# Patient Record
Sex: FEMALE | Race: WHITE
[De-identification: names, ages, dates, MRNs, and addresses within clinical notes are randomized per-mention and may not be internally consistent; named-entity substitution may affect disease eponyms.]

---

## 2019-06-30 ENCOUNTER — HOSPITAL ENCOUNTER (EMERGENCY)
Dept: HOSPITAL 11 - JP.ED | Age: 29
Discharge: HOME | End: 2019-06-30
Payer: COMMERCIAL

## 2019-06-30 DIAGNOSIS — Z91.041: ICD-10-CM

## 2019-06-30 DIAGNOSIS — Z88.5: ICD-10-CM

## 2019-06-30 DIAGNOSIS — Z88.0: ICD-10-CM

## 2019-06-30 DIAGNOSIS — E86.0: Primary | ICD-10-CM

## 2019-06-30 DIAGNOSIS — Z79.899: ICD-10-CM

## 2019-06-30 DIAGNOSIS — Z87.891: ICD-10-CM

## 2019-06-30 DIAGNOSIS — F41.9: ICD-10-CM

## 2019-06-30 DIAGNOSIS — F32.9: ICD-10-CM

## 2019-06-30 PROCEDURE — 87899 AGENT NOS ASSAY W/OPTIC: CPT

## 2019-06-30 PROCEDURE — 87209 SMEAR COMPLEX STAIN: CPT

## 2019-06-30 PROCEDURE — 87493 C DIFF AMPLIFIED PROBE: CPT

## 2019-06-30 PROCEDURE — 96374 THER/PROPH/DIAG INJ IV PUSH: CPT

## 2019-06-30 PROCEDURE — 89055 LEUKOCYTE ASSESSMENT FECAL: CPT

## 2019-06-30 PROCEDURE — 80053 COMPREHEN METABOLIC PANEL: CPT

## 2019-06-30 PROCEDURE — 85025 COMPLETE CBC W/AUTO DIFF WBC: CPT

## 2019-06-30 PROCEDURE — 96375 TX/PRO/DX INJ NEW DRUG ADDON: CPT

## 2019-06-30 PROCEDURE — 36415 COLL VENOUS BLD VENIPUNCTURE: CPT

## 2019-06-30 PROCEDURE — 96361 HYDRATE IV INFUSION ADD-ON: CPT

## 2019-06-30 PROCEDURE — 87177 OVA AND PARASITES SMEARS: CPT

## 2019-06-30 PROCEDURE — 99283 EMERGENCY DEPT VISIT LOW MDM: CPT

## 2019-06-30 PROCEDURE — 87046 STOOL CULTR AEROBIC BACT EA: CPT

## 2019-06-30 NOTE — EDM.PDOC
<Francisco Cedillo - Last Filed: 06/30/19 17:41>





ED HPI GENERAL MEDICAL PROBLEM





- General


Chief Complaint: Gastrointestinal Problem


Stated Complaint: NAUSEA,VOMITING


Time Seen by Provider: 06/30/19 16:05


Source of Information: Reports: Patient, Family


History Limitations: Reports: No Limitations





- History of Present Illness


INITIAL COMMENTS - FREE TEXT/NARRATIVE: 





28-year-old female, usually healthy received a gastric sleeve surgery 2 years 

ago and has lost 200 pounds developed an enteritis syndrome over the past 4 

days. She's had persistent diarrhea. Her daughter and mother both had similar 

symptoms but were only sick for 24-48 hours, she has been sick for 4 days. Over 

the past 24 hours she's now developed nausea and vomiting. No fevers or chills. 

Some abdominal cramping, but no significant pain, no urinary symptoms, no 

shortness of breath. Denies any rashes or joint pains. She stopped in her 

primary care doctor's office Friday before leaving for vacation, he recommended 

IV fluids but she elected to try to stay hydrated on her own. She has not seen 

any blood in the diarrhea or emesis.


Onset: Gradual


Duration: Day(s): (4 days)


Location: Reports: Abdomen


Associated Symptoms: Reports: Loss of Appetite, Malaise, Nausea/Vomiting, 

Weakness, Other (Persistent diarrhea).  Denies: Fever/Chills





- Related Data


 Allergies











Allergy/AdvReac Type Severity Reaction Status Date / Time


 


codeine Allergy  Vomiting Verified 06/30/19 15:37


 


Penicillins Allergy  Hives Verified 06/30/19 15:37


 


iv contrast Allergy  Vomiting Uncoded 06/30/19 15:37











Home Meds: 


 Home Meds





FLUoxetine [PROzac] 40 mg PO DAILY 06/30/19 [History]


LORazepam [Ativan] 1 tab PO BID PRN 06/30/19 [History]


Mirtazapine 45 mg PO BEDTIME 06/30/19 [History]


Promethazine [Phenergan] 25 mg PO Q6H PRN 06/30/19 [History]


medroxyPROGESTERone Acetate [Depo-Provera] 1 injection IM ASDIRECTED 06/30/19 [

History]











Past Medical History


Respiratory History: Reports: Asthma


OB/GYN History: Reports: Pregnancy


Psychiatric History: Reports: Anxiety, Depression





- Infectious Disease History


Infectious Disease History: Reports: Chicken Pox, Shingles





- Past Surgical History


GI Surgical History: Reports: Bariatric Procedure


Other GI Surgeries/Procedures: gastric sleeve


Dermatological Surgical History: Reports: Plastic Surgical Reconstruction/Repair





Social & Family History





- Tobacco Use


Smoking Status *Q: Former Smoker


Years of Tobacco use: 3


Packs/Tins Daily: 1


Used Tobacco, but Quit: Yes


Month/Year Tobacco Last Used: 10/2015





- Caffeine Use


Caffeine Use: Reports: Coffee





- Recreational Drug Use


Recreational Drug Use: No





ED ROS GENERAL





- Review of Systems


Review Of Systems: See Below


Constitutional: Reports: Malaise, Decreased Appetite.  Denies: Fever, Chills


HEENT: Reports: No Symptoms


Respiratory: Denies: Shortness of Breath


Cardiovascular: Denies: Chest Pain


GI/Abdominal: Reports: Abdominal Pain, Diarrhea, Nausea, Vomiting


: Reports: No Symptoms


Skin: Reports: No Symptoms


Neurological: Reports: No Symptoms, Dizziness.  Denies: Headache


Psychiatric: Reports: Anxiety





ED EXAM, GI/ABD





- Physical Exam


Exam: See Below


Exam Limited By: No Limitations


General Appearance: Alert, No Apparent Distress, Other (Looks tired, pale but 

no distress)


Eyes: Bilateral: Normal Appearance


Respiratory/Chest: No Respiratory Distress (No jaundice), Lungs Clear


Cardiovascular: Regular Rate, Rhythm.  No: Tachycardia


GI/Abdominal Exam: Normal Bowel Sounds, Soft, Tender (Some mild discomfort with 

palpation in the epigastric area, no guarding)


Extremities: Normal Inspection


Neurological: Alert, Oriented


Skin Exam: Warm, Dry





Course





- Vital Signs


Last Recorded V/S: 





 Last Vital Signs











Temp  97.9 F   06/30/19 15:54


 


Pulse  80   06/30/19 15:54


 


Resp  16   06/30/19 15:54


 


BP  127/70   06/30/19 15:54


 


Pulse Ox  97   06/30/19 15:54














- Orders/Labs/Meds


Orders: 





 Active Orders 24 hr











 Category Date Time Status


 


 CLOSTRIDIUM DIFFICILE BY PCR [RM] Stat Lab  06/30/19 16:26 Results


 


 CULTURE STOOL + SHIGATOX [RM] Stat Lab  06/30/19 16:26 Results


 


 OVA + PARASITE EXAM Stat Lab  06/30/19 16:26 Received


 


 Sodium Chloride 0.9% [Normal Saline] 1,000 ml Med  06/30/19 16:30 Active





 IV ASDIRECTED   


 


 Sodium Chloride 0.9% [Normal Saline] 1,000 ml Med  06/30/19 17:45 Active





 IV ASDIRECTED   


 


 Isolation [COMM] Stat Oth  06/30/19 16:18 Ordered








 Medication Orders





Sodium Chloride (Normal Saline)  1,000 mls @ 1,000 mls/hr IV ASDIRECTED KT


   Last Admin: 06/30/19 16:27  Dose: 1,000 mls/hr


Sodium Chloride (Normal Saline)  1,000 mls @ 1,000 mls/hr IV ASDIRECTED KT


   Last Admin: 06/30/19 17:49  Dose: 1,000 mls/hr








Labs: 





 Laboratory Tests











  06/30/19 06/30/19 Range/Units





  16:28 16:28 


 


WBC  6.1   (4.5-11.0)  K/uL


 


RBC  5.07   (3.30-5.50)  M/uL


 


Hgb  14.5   (12.0-15.0)  g/dL


 


Hct  43.4   (36.0-48.0)  %


 


MCV  86   (80-98)  fL


 


MCH  29   (27-31)  pg


 


MCHC  33   (32-36)  %


 


Plt Count  298   (150-400)  K/uL


 


Neut % (Auto)  48   (36-66)  %


 


Lymph % (Auto)  33   (24-44)  %


 


Mono % (Auto)  12 H   (2-6)  %


 


Eos % (Auto)  4   (2-4)  %


 


Baso % (Auto)  3 H   (0-1)  %


 


Sodium   140  (140-148)  mmol/L


 


Potassium   3.6  (3.6-5.2)  mmol/L


 


Chloride   103  (100-108)  mmol/L


 


Carbon Dioxide   25  (21-32)  mmol/L


 


Anion Gap   11.8  (5.0-14.0)  mmol/L


 


BUN   18  (7-18)  mg/dL


 


Creatinine   0.9  (0.6-1.0)  mg/dL


 


Est Cr Clr Drug Dosing   95.55  mL/min


 


Estimated GFR (MDRD)   > 60  (>60)  


 


Glucose   88  ()  mg/dL


 


Calcium   8.8  (8.5-10.1)  mg/dL


 


Total Bilirubin   0.4  (0.2-1.0)  mg/dL


 


AST   18  (15-37)  U/L


 


ALT   21  (12-78)  U/L


 


Alkaline Phosphatase   61  ()  U/L


 


Total Protein   7.1  (6.4-8.2)  g/dL


 


Albumin   3.4  (3.4-5.0)  g/dL


 


Globulin   3.7 H  (2.3-3.5)  g/dL


 


Albumin/Globulin Ratio   0.9 L  (1.2-2.2)  











Meds: 





Medications











Generic Name Dose Route Start Last Admin





  Trade Name Freq  PRN Reason Stop Dose Admin


 


Sodium Chloride  1,000 mls @ 1,000 mls/hr  06/30/19 16:30  06/30/19 16:27





  Normal Saline  IV   1,000 mls/hr





  ASDIRECTED KT   Administration





     





     





     





     


 


Sodium Chloride  1,000 mls @ 1,000 mls/hr  06/30/19 17:45  06/30/19 17:49





  Normal Saline  IV   1,000 mls/hr





  ASDIRECTED KT   Administration





     





     





     





     














Discontinued Medications














Generic Name Dose Route Start Last Admin





  Trade Name Immanuelq  PRN Reason Stop Dose Admin


 


Hyoscyamine  0.125 mg  06/30/19 18:53  06/30/19 19:04





  Hyomax-Sl  SL  06/30/19 18:54  0.125 mg





  ONETIME ONE   Administration





     





     





     





     


 


Ketorolac Tromethamine  30 mg  06/30/19 17:39  06/30/19 17:50





  Toradol  IVPUSH  06/30/19 17:40  30 mg





  ONETIME ONE   Administration





     





     





     





     


 


Loperamide HCl  2 mg  06/30/19 17:56  06/30/19 18:03





  Imodium  PO  06/30/19 17:57  2 mg





  ONETIME ONE   Administration





     





     





     





     


 


Lorazepam  0.5 mg  06/30/19 16:47  06/30/19 17:31





  Ativan  IVPUSH  06/30/19 16:48  0.5 mg





  ONETIME ONE   Administration





     





     





     





     


 


Ondansetron HCl  4 mg  06/30/19 16:47  06/30/19 17:31





  Zofran  IVPUSH  06/30/19 16:48  4 mg





  ONETIME ONE   Administration





     





     





     





     














- Re-Assessments/Exams


Free Text/Narrative Re-Assessment/Exam: 





06/30/19 17:27


Normal saline IV bolus was started, CBC CMP was obtained as well as a stool for 

culture, C. difficile, WBCs and ova and parasite. She was given 4 mg of IV 

Zofran, and 0.5 mg of IV Ativan. The diarrhea continued with the nausea 

improved. Initial lab returns revealed a normal CBC and CMP. Stool was negative 

for WBC and C. difficile was also negative. Plan is to give an additional liter 

of normal saline and discharged with oral Zofran.


06/30/19 17:41


Patient complains of a somewhat persistent mild headache so was given 30 mg of 

IV Toradol. Care was turned over to Dr. Bowie for discharge.





Departure





- Departure


Disposition: Home, Self-Care 01


Clinical Impression: 


 Dehydration








- Discharge Information


Instructions:  Rehydration, Adult


Referrals: 


PCP,None [Primary Care Provider] - 


Forms:  ED Department Discharge


Additional Instructions: 


Use Zofran as needed for nausea and vomiting symptoms, use the hyoscyamine as 

needed for abdominal cramping, please follow-up with primary care provider upon 

return if not better call return to the emergency department worsening of 

symptoms





<Darrell Bowie - Last Filed: 06/30/19 19:52>





Departure





- Departure


Time of Disposition: 19:51


Condition: Fair





- Assessment/Plan


Assessment:: 





Assessment





Acuity = acute





Site and laterality = dehydration  





Etiology  = probable viral cause  





Manifestations = nausea vomiting diarrhea 





Location of injury =  Home





Lab values = CBC, CMP unremarkable





Plan


She had good improvement combination 2 L of fluids and Toradol she did complain 

of some crampy abdominal pain trial of Anaspaz provided significant relief for 

her therefore prescriptions for Anaspaz 0.125 mg one tab by mouth every 4 hours 

when necessary total #10 prescription for Zofran also provided  

















 This note was dictated using dragon voice recognition software please call 

with any questions on syntax or grammar.